# Patient Record
(demographics unavailable — no encounter records)

---

## 2024-11-19 NOTE — PHYSICAL EXAM
[Nasal Endoscopy Performed] : nasal endoscopy was performed, see procedure section for findings [] : septum deviated bilaterally [Midline] : trachea located in midline position [Normal] : no rashes [de-identified] : mod inf turb hyhpertorphy
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
## 2024-11-19 NOTE — ASSESSMENT
[FreeTextEntry1] : Patient with hx of nsla congestion - problem for several years .  Has mod dns and hypertrophic inf turb - no evidence of sinusitis.  Recommended trial of azelastine and floanse and followup in about 3 mos.  If not better would consider rhinology eval.
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
## 2024-11-19 NOTE — REASON FOR VISIT
[Initial Consultation] : an initial consultation for [FreeTextEntry2] : difficulty breathing/ deviated septum